# Patient Record
Sex: MALE
[De-identification: names, ages, dates, MRNs, and addresses within clinical notes are randomized per-mention and may not be internally consistent; named-entity substitution may affect disease eponyms.]

---

## 2021-08-04 PROBLEM — Z00.00 ENCOUNTER FOR PREVENTIVE HEALTH EXAMINATION: Status: ACTIVE | Noted: 2021-08-04

## 2021-09-03 ENCOUNTER — APPOINTMENT (OUTPATIENT)
Dept: NEUROLOGY | Facility: CLINIC | Age: 26
End: 2021-09-03
Payer: COMMERCIAL

## 2021-09-03 VITALS
BODY MASS INDEX: 26.67 KG/M2 | OXYGEN SATURATION: 95 % | HEIGHT: 68 IN | DIASTOLIC BLOOD PRESSURE: 74 MMHG | SYSTOLIC BLOOD PRESSURE: 107 MMHG | WEIGHT: 176 LBS | HEART RATE: 82 BPM | TEMPERATURE: 98.2 F

## 2021-09-03 DIAGNOSIS — G37.9 DEMYELINATING DISEASE OF CENTRAL NERVOUS SYSTEM, UNSPECIFIED: ICD-10-CM

## 2021-09-03 PROCEDURE — 99205 OFFICE O/P NEW HI 60 MIN: CPT

## 2021-09-03 NOTE — CONSULT LETTER
[Dear  ___] : Dear  [unfilled], [Consult Letter:] : I had the pleasure of evaluating your patient, [unfilled]. [Please see my note below.] : Please see my note below. [Consult Closing:] : Thank you very much for allowing me to participate in the care of this patient.  If you have any questions, please do not hesitate to contact me. [Sincerely,] : Sincerely, [FreeTextEntry3] : Ramiro Rivas MD MSc\par Neuroimmunology and Multiple Sclerosis\par [facility]\par [facility]

## 2021-09-03 NOTE — HISTORY OF PRESENT ILLNESS
[FreeTextEntry1] : Reason for consult: abnormal brain mri\par \par HPI: EMILY ULLOA is a 25 year old man \par \par Late 2020 - horizontal diplopia, intermittent, unclear if monocular of binocular, lasted 2 months and then resolved. \par Chronic HAs x3y, daily, sometimes better at night.\par Sleeps 10-12h.\par Some memory dysfunction as well.\par Some dizziness after work. 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  Normally conversant.  Follows commands, names, and repeats.  Good attention.\par \par PERRL, VFF, EOMI, no nystagmus, face symmetric, TUP at midline.\par \par Motor: \par                                                 R:                               L:\par Del                                           5                                5\par Bi                                              5                               5\par Tri                                            5                               5\par Wrist Extensors                      5                               5\par Finger abductors                    5                               5\par                                         5                               5 \par \par HF                                           5                               5\par KE                                           5                               5\par KF                                           5                               5\par DF                                           5                               5\par PF                                           5                               5\par \par Tone                                       R                               L\par UE                                          0                                0 \par LE                                          0                                0\par \par Sensory                                RUE                      LUE                 RLE                LLE     \par LT                                           +                            +                      +                   +\par Vib                                          +                            +                      +                   +\par JPS                                         +                            +                      +                   +\par PP                                         +                            +                      +                   +\par Temp                                     +                            +                      +                   +\par \par Reflexes:\par                                              R                             L                            \par Biceps                                  2                             2\par BR                                        2                             2\par Triceps                                2                              2\par Pat                                        2                            2 \par AJ                                        2                             2\par \par TOES                                    F                            F\par \par \par Coordination:\par                                              R                             L                       \par FTN                                       0                             0 \par KYE                                      0                            0\par HTS                                      0                             0 \par \par Other                                                                          \par  \par Gait: normal, can heel, toe, tandem\par \par                     Assistance: none\par \par MRI brain 7/2021 - reviewed, notable for several PV t2h with a typical appearance, zheng's fingers on sag flair.\par \par AP: 26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 He does not meet criteria for MS. The diplopia is concerning for an exacerbation, although there are no signs on exam to definitively support this. The cognitive symptoms may be related, but nonspecific and could be related to daily headaches. At this time, my recommendation is to defer DMT use and to repeat MRIs in the short term. \par \par all questions answered, education provided, management discussed at length. counseled on lifestyle, vitD.\par \par - labs\par - MRI brain, C, and T in late 10/2021 (3m interval, w and wo)\par - f/u with Grisel Rosado for [facility] management\par - will consider neuropsych testing referral at next visit\par - RTC after new MRIs\par \par \par

## 2021-09-14 ENCOUNTER — TRANSCRIPTION ENCOUNTER (OUTPATIENT)
Age: 26
End: 2021-09-14

## 2021-09-23 ENCOUNTER — NON-APPOINTMENT (OUTPATIENT)
Age: 26
End: 2021-09-23

## 2021-10-16 ENCOUNTER — RESULT REVIEW (OUTPATIENT)
Age: 26
End: 2021-10-16

## 2021-10-16 ENCOUNTER — OUTPATIENT (OUTPATIENT)
Dept: OUTPATIENT SERVICES | Facility: HOSPITAL | Age: 26
LOS: 1 days | End: 2021-10-16

## 2021-10-16 ENCOUNTER — APPOINTMENT (OUTPATIENT)
Dept: MRI IMAGING | Facility: CLINIC | Age: 26
End: 2021-10-16
Payer: COMMERCIAL

## 2021-10-16 PROCEDURE — 72156 MRI NECK SPINE W/O & W/DYE: CPT | Mod: 26

## 2021-10-16 PROCEDURE — 72157 MRI CHEST SPINE W/O & W/DYE: CPT | Mod: 26

## 2021-10-16 PROCEDURE — 70553 MRI BRAIN STEM W/O & W/DYE: CPT | Mod: 26

## 2021-10-25 ENCOUNTER — LABORATORY RESULT (OUTPATIENT)
Age: 26
End: 2021-10-25

## 2021-10-26 ENCOUNTER — NON-APPOINTMENT (OUTPATIENT)
Age: 26
End: 2021-10-26

## 2021-10-26 LAB
25(OH)D3 SERPL-MCNC: 24.1 NG/ML
ALBUMIN SERPL ELPH-MCNC: 4.7 G/DL
ALP BLD-CCNC: 55 U/L
ALT SERPL-CCNC: 15 U/L
ANION GAP SERPL CALC-SCNC: 13 MMOL/L
AST SERPL-CCNC: 14 U/L
B BURGDOR IGG+IGM SER QL IB: NORMAL
BASOPHILS # BLD AUTO: 0.03 K/UL
BASOPHILS NFR BLD AUTO: 0.4 %
BILIRUB SERPL-MCNC: 0.3 MG/DL
BUN SERPL-MCNC: 18 MG/DL
CALCIUM SERPL-MCNC: 9.5 MG/DL
CHLORIDE SERPL-SCNC: 103 MMOL/L
CO2 SERPL-SCNC: 24 MMOL/L
CREAT SERPL-MCNC: 1.2 MG/DL
EOSINOPHIL # BLD AUTO: 0.03 K/UL
EOSINOPHIL NFR BLD AUTO: 0.4 %
FOLATE SERPL-MCNC: 13.1 NG/ML
GLUCOSE SERPL-MCNC: 87 MG/DL
HCT VFR BLD CALC: 49 %
HGB BLD-MCNC: 16.6 G/DL
IMM GRANULOCYTES NFR BLD AUTO: 0.6 %
LYMPHOCYTES # BLD AUTO: 1.9 K/UL
LYMPHOCYTES NFR BLD AUTO: 22.2 %
MAN DIFF?: NORMAL
MCHC RBC-ENTMCNC: 30.9 PG
MCHC RBC-ENTMCNC: 33.9 GM/DL
MCV RBC AUTO: 91.2 FL
MONOCYTES # BLD AUTO: 0.54 K/UL
MONOCYTES NFR BLD AUTO: 6.3 %
NEUTROPHILS # BLD AUTO: 6.02 K/UL
NEUTROPHILS NFR BLD AUTO: 70.1 %
PLATELET # BLD AUTO: 219 K/UL
POTASSIUM SERPL-SCNC: 4.2 MMOL/L
PROT SERPL-MCNC: 7.2 G/DL
RBC # BLD: 5.37 M/UL
RBC # FLD: 11.8 %
SODIUM SERPL-SCNC: 141 MMOL/L
TSH SERPL-ACNC: 1.88 UIU/ML
VIT B12 SERPL-MCNC: 820 PG/ML
WBC # FLD AUTO: 8.57 K/UL

## 2021-10-27 ENCOUNTER — APPOINTMENT (OUTPATIENT)
Dept: NEUROLOGY | Facility: CLINIC | Age: 26
End: 2021-10-27
Payer: COMMERCIAL

## 2021-10-27 LAB — DSDNA AB SER-ACNC: <12 IU/ML

## 2021-10-27 PROCEDURE — 99214 OFFICE O/P EST MOD 30 MIN: CPT | Mod: 95

## 2021-10-28 DIAGNOSIS — Z79.899 OTHER LONG TERM (CURRENT) DRUG THERAPY: ICD-10-CM

## 2021-10-28 LAB — ANA SER IF-ACNC: NEGATIVE

## 2021-10-29 ENCOUNTER — TRANSCRIPTION ENCOUNTER (OUTPATIENT)
Age: 26
End: 2021-10-29

## 2021-10-29 LAB
ENA SS-A AB SER IA-ACNC: <0.2 AL
ENA SS-B AB SER IA-ACNC: <0.2 AL

## 2021-10-29 NOTE — HISTORY OF PRESENT ILLNESS
[FreeTextEntry1] : Inital hx 9/2021\par Late 2020 - horizontal diplopia, intermittent, unclear if monocular of binocular, lasted 2 months and then resolved. \par Chronic HAs x3y, daily, sometimes better at night.\par Sleeps 10-12h.\par Some memory dysfunction as well.\par Some dizziness after work. \par Sees Grisel Rosado at [facility].\par Got brain MRI which showed lesions.\par \par Subj interval:\par \par Today, has noted that his R hand is harder to move, less noticeable over past few days. No trouble with buttoning buttons. slight decreased sensation. \par \par PMHX:\par chronic headaches\par testosterone issues leading to depression, now resolved\par \par MEDS:\par clomid \par finasteride\par nurtec\par \par O:\par \par AO3. Normally conversant. Follows commands, names, and repeats. 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 He does not meet criteria for MS. The diplopia is concerning for an exacerbation, although there are no signs on exam to definitively support this. The cognitive symptoms may be related, but nonspecific and could be related to daily headaches. At this time, my recommendation is to defer DMT use and to repeat MRIs in the short term. \par \par all questions answered, education provided, management discussed at length. counseled on lifestyle, vitD.\par \par - pre-DMT labs\par - start vitamin D3 2ku\par - will consider neuropsych referral at next visit\par - RTC 11/10.\par

## 2021-11-04 LAB
25(OH)D3 SERPL-MCNC: 24.3 NG/ML
ALBUMIN SERPL ELPH-MCNC: 4.8 G/DL
ALP BLD-CCNC: 62 U/L
ALT SERPL-CCNC: 23 U/L
AST SERPL-CCNC: 19 U/L
BASOPHILS # BLD AUTO: 0.04 K/UL
BASOPHILS NFR BLD AUTO: 0.5 %
BILIRUB DIRECT SERPL-MCNC: 0.1 MG/DL
BILIRUB INDIRECT SERPL-MCNC: 0.3 MG/DL
BILIRUB SERPL-MCNC: 0.4 MG/DL
EOSINOPHIL # BLD AUTO: 0.04 K/UL
EOSINOPHIL NFR BLD AUTO: 0.5 %
HCT VFR BLD CALC: 51.5 %
HGB BLD-MCNC: 17.2 G/DL
IMM GRANULOCYTES NFR BLD AUTO: 0.6 %
LYMPHOCYTES # BLD AUTO: 2.04 K/UL
LYMPHOCYTES NFR BLD AUTO: 25.5 %
MAN DIFF?: NORMAL
MCHC RBC-ENTMCNC: 30.4 PG
MCHC RBC-ENTMCNC: 33.4 GM/DL
MCV RBC AUTO: 91 FL
MONOCYTES # BLD AUTO: 0.49 K/UL
MONOCYTES NFR BLD AUTO: 6.1 %
NEUTROPHILS # BLD AUTO: 5.34 K/UL
NEUTROPHILS NFR BLD AUTO: 66.8 %
PLATELET # BLD AUTO: 261 K/UL
PROT SERPL-MCNC: 7.5 G/DL
RBC # BLD: 5.66 M/UL
RBC # FLD: 11.6 %
VZV AB TITR SER: POSITIVE
VZV IGG SER IF-ACNC: 3374 INDEX
WBC # FLD AUTO: 8 K/UL

## 2021-11-07 LAB
M TB IFN-G BLD-IMP: NEGATIVE
QUANTIFERON TB PLUS MITOGEN MINUS NIL: 7.68 IU/ML
QUANTIFERON TB PLUS NIL: 0.04 IU/ML
QUANTIFERON TB PLUS TB1 MINUS NIL: 0 IU/ML
QUANTIFERON TB PLUS TB2 MINUS NIL: -0.01 IU/ML

## 2021-11-10 ENCOUNTER — APPOINTMENT (OUTPATIENT)
Dept: NEUROLOGY | Facility: CLINIC | Age: 26
End: 2021-11-10
Payer: COMMERCIAL

## 2021-11-10 LAB
JCV INDEX: 1.67
STRATIFY JCV ANTIBODY: POSITIVE

## 2021-11-10 PROCEDURE — 99215 OFFICE O/P EST HI 40 MIN: CPT | Mod: 95

## 2021-11-11 ENCOUNTER — TRANSCRIPTION ENCOUNTER (OUTPATIENT)
Age: 26
End: 2021-11-11

## 2021-11-11 NOTE — HISTORY OF PRESENT ILLNESS
[Home] : at home, [unfilled] , at the time of the visit. [Medical Office: ([facility])___] : at the medical office located in  [Verbal consent obtained from patient] : the patient, [unfilled] [FreeTextEntry1] : Inital hx 9/2021\par Late 2020 - horizontal diplopia, intermittent, unclear if monocular of binocular, lasted 2 months and then resolved. Associated with urinary frequency.\par Chronic HAs x3y, daily, sometimes better at night.\par Sleeps 10-12h.\par Some memory dysfunction as well.\par Some dizziness after work. \par Sees Grisel Rosado at [facility].\par Got brain MRI which showed lesions.\par 10/2021 - Today, has noted that his R hand is harder to move, less noticeable over past few days. No trouble with buttoning buttons. slight decreased sensation. \par \par \par Subj interval:\par \par R hand back to normal. No new symptoms.\par \par PMHX:\par chronic headaches\par testosterone issues leading to depression, now resolved\par \par MEDS:\par clomid \par finasteride\par nurtec and ubrelvy. tried triptans and didn't help. \par \par \par O:\par \par AO3. Normally conversant. Follows commands, names, and repeats. 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 He does not meet criteria for MS. The diplopia is concerning for an exacerbation, although there are no signs on exam to definitively support this. The cognitive symptoms may be related, but nonspecific and could be related to daily headaches. At this time, my recommendation is to proceed with DMT given dynamic RIS. \par \par all questions answered, education provided, management discussed at length. counseled on lifestyle, vitD.\par \par - discussed vumerity vs copaxone, full risk/benefit of each, including infectious, rare brain infection as it applies to each. he is leaning towards vumerity but wants to discuss with his sister - he will let me know.\par - cont vitamin D3 2ku\par - will consider neuropsych referral at next visit\par - RTC 11/10.\par

## 2021-11-12 ENCOUNTER — TRANSCRIPTION ENCOUNTER (OUTPATIENT)
Age: 26
End: 2021-11-12

## 2021-11-17 ENCOUNTER — TRANSCRIPTION ENCOUNTER (OUTPATIENT)
Age: 26
End: 2021-11-17

## 2021-11-18 ENCOUNTER — NON-APPOINTMENT (OUTPATIENT)
Age: 26
End: 2021-11-18

## 2021-12-09 ENCOUNTER — TRANSCRIPTION ENCOUNTER (OUTPATIENT)
Age: 26
End: 2021-12-09

## 2021-12-13 ENCOUNTER — TRANSCRIPTION ENCOUNTER (OUTPATIENT)
Age: 26
End: 2021-12-13

## 2022-01-03 ENCOUNTER — TRANSCRIPTION ENCOUNTER (OUTPATIENT)
Age: 27
End: 2022-01-03

## 2022-01-14 ENCOUNTER — APPOINTMENT (OUTPATIENT)
Dept: NEUROLOGY | Facility: CLINIC | Age: 27
End: 2022-01-14

## 2022-01-28 ENCOUNTER — APPOINTMENT (OUTPATIENT)
Dept: NEUROLOGY | Facility: CLINIC | Age: 27
End: 2022-01-28

## 2022-02-25 ENCOUNTER — APPOINTMENT (OUTPATIENT)
Dept: NEUROLOGY | Facility: CLINIC | Age: 27
End: 2022-02-25
Payer: COMMERCIAL

## 2022-02-25 VITALS
OXYGEN SATURATION: 98 % | SYSTOLIC BLOOD PRESSURE: 124 MMHG | HEIGHT: 69 IN | DIASTOLIC BLOOD PRESSURE: 76 MMHG | HEART RATE: 100 BPM | BODY MASS INDEX: 24.29 KG/M2 | WEIGHT: 164 LBS | TEMPERATURE: 97.4 F

## 2022-02-25 DIAGNOSIS — G35 MULTIPLE SCLEROSIS: ICD-10-CM

## 2022-02-25 PROCEDURE — 99214 OFFICE O/P EST MOD 30 MIN: CPT

## 2022-02-25 NOTE — HISTORY OF PRESENT ILLNESS
[FreeTextEntry1] : Inital hx 9/2021\par Difficulties with bladder dysfunction in the past several years - frequency, some mild urgency.\par Late 2020 - horizontal diplopia, intermittent, unclear if monocular of binocular, lasted 2 months and then resolved. Associated with urinary frequency.\par Chronic HAs x3y, daily, sometimes better at night.\par Sleeps 10-12h.\par Some memory dysfunction as well.\par Some dizziness after work. \par Sees Grisel Rosado at [facility].\par Got brain MRI which showed lesions.\par 10/2021 - Today, has noted that his R hand is harder to move, less noticeable over past few days. No trouble with buttoning buttons. slight decreased sensation. by 11/2021, resolved. \par 11/2021 - recurrence of diplopia but seemed monocular.\par 11/2021 - Numbness/weakness in RLE that persisted\par 12/2021 - developed electric shock sensations in both legs, lasted several weeks. \par \par \par Subj interval:\par \par Started vumerity in 12/2021. No s/e. \par \par Headaches have subsided a lot. Occurs 2x/wk, milder, but lasts several hours. Did not get any botox. Nurtec had worked very well in the past. Ubrelvy also worked somewhat. Sumatriptan, rizatriptan, and amitryptiline didn't help. \par \par Diplopia still persists, monocular.\par \par Since 11/2021, RLE feels different than the LLE, temperature feels strange. \par \par No current BB dysfunction. Difficulties with bladder dysfunction in the past, also with ED/sex drive. Went to endocrinologist who gave him testosterone supplements. \par \par PMHX:\par chronic headaches\par testosterone issues leading to depression, now resolved\par \par MEDS:\par finasteride\par vumerity\par vitD3 2ku \par \par O:\par \par AO3. Normally conversant. Follows commands, names, and repeats. Good attention.\par \par PERRL, VFF, EOMI, no nystagmus, face symmetric, TUP at midline.\par \par Motor: \par    R:  L:\par Del   5  5\par Bi   5  5\par Tri   5  5\par Wrist Extensors  5  5\par Finger abductors  5  5\par    5  5 \par \par HF   5  5\par KE   5  5\par KF   5  5\par DF   5  5\par PF   5  5\par \par Tone   R  L\par UE   0  0 \par LE   0  0\par \par Sensory  RUE  LUE  RLE LLE \par LT   +  +  +  +\par Vib   +  +  +  +\par JPS   +  +  +  +\par PP   +  +  +  +\par Temp   +  +  +  +\par \par Reflexes:\par    R  L  \par Biceps   2  2\par BR   2  2\par Triceps  2  2\par Pat   2  2 \par AJ   2  2\par \par TOES   F  F\par \par \par Coordination:\par    R  L  \par FTN   0  0 \par KYE   0  0\par HTS   0  0 \par \par Other     \par  \par Gait: normal, can heel, toe, tandem\par \par   Assistance: none\par \par \par \par MRI brain 7/2021 - reviewed, notable for several PV t2h with a typical appearance, zheng's fingers on sag flair.\par \par MRI brain 10/2021 - new enhancing left temporal PV lesion.\par \par MRI C+T 10/2021 - no definite cord lesions. 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 He does not meet criteria for MS. The diplopia is concerning for an exacerbation, although there are no signs on exam to definitively support this. The cognitive symptoms may be related, but nonspecific and could be related to daily headaches. At this time, my recommendation is to proceed with DMT given dynamic RIS. \par \par all questions answered, education provided, management discussed at length. counseled on lifestyle, vitD.\par \par - cont vumerity\par - cont vitamin D3 2ku\par - new brain, C, and T spine MRIs (new baseline on new med, also with recent new sx)\par - lab work\par - restart nurtec for migraines (for auth: failed sumatriptan and rizatriptan, failed amitryptline, nurtec has worked well in the past).\par - RTC 3m

## 2022-03-01 ENCOUNTER — TRANSCRIPTION ENCOUNTER (OUTPATIENT)
Age: 27
End: 2022-03-01

## 2022-03-02 ENCOUNTER — TRANSCRIPTION ENCOUNTER (OUTPATIENT)
Age: 27
End: 2022-03-02

## 2022-03-08 ENCOUNTER — APPOINTMENT (OUTPATIENT)
Dept: MRI IMAGING | Facility: CLINIC | Age: 27
End: 2022-03-08
Payer: COMMERCIAL

## 2022-03-08 ENCOUNTER — RESULT REVIEW (OUTPATIENT)
Age: 27
End: 2022-03-08

## 2022-03-08 ENCOUNTER — OUTPATIENT (OUTPATIENT)
Dept: OUTPATIENT SERVICES | Facility: HOSPITAL | Age: 27
LOS: 1 days | End: 2022-03-08

## 2022-03-08 PROCEDURE — 72156 MRI NECK SPINE W/O & W/DYE: CPT | Mod: 26

## 2022-03-08 PROCEDURE — 72157 MRI CHEST SPINE W/O & W/DYE: CPT | Mod: 26

## 2022-03-08 PROCEDURE — 70553 MRI BRAIN STEM W/O & W/DYE: CPT | Mod: 26

## 2022-03-12 ENCOUNTER — TRANSCRIPTION ENCOUNTER (OUTPATIENT)
Age: 27
End: 2022-03-12

## 2022-03-15 ENCOUNTER — TRANSCRIPTION ENCOUNTER (OUTPATIENT)
Age: 27
End: 2022-03-15

## 2022-03-25 ENCOUNTER — APPOINTMENT (OUTPATIENT)
Dept: NEUROLOGY | Facility: CLINIC | Age: 27
End: 2022-03-25
Payer: COMMERCIAL

## 2022-03-25 PROCEDURE — 99214 OFFICE O/P EST MOD 30 MIN: CPT | Mod: 95

## 2022-03-25 NOTE — HISTORY OF PRESENT ILLNESS
[Home] : at home, [unfilled] , at the time of the visit. [Medical Office: ([facility])___] : at the medical office located in  [Verbal consent obtained from patient] : the patient, [unfilled] [FreeTextEntry1] : Inital hx 9/2021\par Difficulties with bladder dysfunction in the past several years - frequency, some mild urgency.\par Late 2020 - horizontal diplopia, intermittent, unclear if monocular of binocular, lasted 2 months and then resolved. Associated with urinary frequency.\par Chronic HAs x3y, daily, sometimes better at night.\par Sleeps 10-12h.\par Some memory dysfunction as well.\par Some dizziness after work. \par Sees Grisel Rosado at [facility].\par Got brain MRI which showed lesions.\par 10/2021 - Today, has noted that his R hand is harder to move, less noticeable over past few days. No trouble with buttoning buttons. slight decreased sensation. by 11/2021, resolved. \par 11/2021 - recurrence of diplopia but seemed monocular.\par 11/2021 - Numbness/weakness in RLE that persisted\par 12/2021 - developed electric shock sensations in both legs, lasted several weeks. \par HAs improved by 2/2022. \par \par \par Subj interval:\par \par Started vumerity in 12/2021. No s/e. \par \par Started aimovig 1wk ago. Over past 2wks, having daily headaches. Uses nurtec QOD on prn basis. Ubrelvy also worked somewhat. Sumatriptan, rizatriptan, and amitryptiline didn't help. \par \par Diplopia still persists, monocular. Has seen ophtho in the past and unremarkable evaluation.\par \par \par PMHX:\par chronic headaches\par testosterone issues leading to depression, now resolved\par RIS/MS\par \par \par MEDS:\par finasteride\par vumerity\par vitD3 2ku \par \par O:\par \par AO3\par face symm\par moves both arms well\par \par \par MRI brain 7/2021 - reviewed, notable for several PV t2h with a typical appearance, zheng's fingers on sag flair.\par \par MRI brain 10/2021 - new enhancing right temporal PV lesion.\par \par MRI C+T 10/2021 - no definite cord lesions. 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 He does not meet criteria for MS. The diplopia is concerning for an exacerbation, although there are no signs on exam to definitively support this, and it appears to be monocular. The cognitive symptoms may be related, but nonspecific and could be related to daily headaches. At this time, my recommendation is to proceed with DMT given dynamic RIS. \par \par all questions answered, education provided, management discussed at length. counseled on lifestyle, vitD.\par \par - cont vumerity\par - cont vitamin D3 2ku\par - new brain, C, and T spine MRIs in 2/2023\par - lab work\par - cont aimovig, cont nurtec prn. consider botox in the future.\par - RTC 3m

## 2022-06-08 ENCOUNTER — TRANSCRIPTION ENCOUNTER (OUTPATIENT)
Age: 27
End: 2022-06-08

## 2022-09-28 ENCOUNTER — RX RENEWAL (OUTPATIENT)
Age: 27
End: 2022-09-28

## 2022-11-22 ENCOUNTER — FORM ENCOUNTER (OUTPATIENT)
Age: 27
End: 2022-11-22

## 2023-02-10 ENCOUNTER — APPOINTMENT (OUTPATIENT)
Dept: NEUROLOGY | Facility: CLINIC | Age: 28
End: 2023-02-10
Payer: COMMERCIAL

## 2023-02-10 VITALS
TEMPERATURE: 98.5 F | DIASTOLIC BLOOD PRESSURE: 83 MMHG | HEIGHT: 69 IN | OXYGEN SATURATION: 98 % | BODY MASS INDEX: 24.29 KG/M2 | WEIGHT: 164 LBS | HEART RATE: 111 BPM | SYSTOLIC BLOOD PRESSURE: 148 MMHG

## 2023-02-10 DIAGNOSIS — M54.2 CERVICALGIA: ICD-10-CM

## 2023-02-10 PROCEDURE — 99214 OFFICE O/P EST MOD 30 MIN: CPT

## 2023-02-10 RX ORDER — RIMEGEPANT SULFATE 75 MG/75MG
75 TABLET, ORALLY DISINTEGRATING ORAL EVERY OTHER DAY
Qty: 15 | Refills: 5 | Status: DISCONTINUED | COMMUNITY
Start: 2022-02-25 | End: 2023-02-10

## 2023-02-17 NOTE — HISTORY OF PRESENT ILLNESS
[FreeTextEntry1] : Inital hx 9/2021\par Difficulties with bladder dysfunction in the past several years - frequency, some mild urgency.\par Late 2020 - horizontal diplopia, intermittent, unclear if monocular of binocular, lasted 2 months and then resolved. Associated with urinary frequency.\par Chronic HAs x3y, daily, sometimes better at night.\par Sleeps 10-12h.\par Some memory dysfunction as well.\par Some dizziness after work. \par Sees Grisel Rosado at [facility].\par Got brain MRI which showed lesions.\par 10/2021 - Today, has noted that his R hand is harder to move, less noticeable over past few days. No trouble with buttoning buttons. slight decreased sensation. by 11/2021, resolved. \par 11/2021 - recurrence of diplopia but seemed monocular.\par 11/2021 - Numbness/weakness in RLE that persisted\par 12/2021 - developed electric shock sensations in both legs, lasted several weeks. \par Started vumerity in 12/2021. No s/e. \par HAs improved by 2/2022 but recurred in 3/2022, then started aimovig and it helped substantially, then he stopped by mistake.\par \par \par \par Subj interval:\par \par No new MS symptoms.\par \par Daily headaches. Was substantially better while on aimovig but he misunderstood and thought he should stop it.  Uses nurtec QOD on prn basis. Ubrelvy also worked somewhat. Sumatriptan, rizatriptan, and amitryptiline didn't help. Some diplopia associated with the HAs.\par \par Mood is improved. \par \par Cognitively feels stable but has difficulties with regards to focus, attention. Adderral helps as well. \par \par Fatigue improved since starting vumerity.\par \par \par PMHX:\par chronic headaches\par testosterone issues leading to depression, now resolved\par RIS/MS\par \par \par MEDS:\par finasteride\par vumerity\par vitD3 2ku \par adderral 10-25mg/day since 2021 - prescribed by psychiatrist\par \par \par O:\par \par AO3. Normally conversant. Follows commands, names, and repeats. Good attention.\par \par PERRL, VFF, EOMI, no nystagmus, face symmetric, TUP at midline.\par \par Motor: \par  R: L:\par Del 5 5\par Bi 5 5\par Tri 5 5\par Wrist Extensors 5 5\par Finger abductors 5 5\par  5 5 \par \par HF 5 5\par KE 5 5\par KF 5 5\par DF 5 5\par PF 5 5\par \par Tone R L\par UE 0 0 \par LE 0 0\par \par Sensory RUE LUE RLE LLE \par LT + + + +\par Vib + + + +\par JPS + + + +\par PP + + + +\par Temp + + + +\par \par Reflexes:\par  R L \par Biceps 2 2\par BR 2 2\par Triceps 2 2\par Pat 2 2 \par AJ 2 2\par \par TOES F F\par \par Coordination:\par  R L \par FTN 0 0 \par KYE 0 0\par HTS 0 0 \par \par Other \par  \par Gait: normal, can heel, toe, tandem\par \par  Assistance: none\par \par \par MRI brain 7/2021 - reviewed, notable for several PV t2h with a typical appearance, zheng's fingers on sag flair.\par \par MRI brain 10/2021 - new enhancing right temporal PV lesion.\par \par MRI C+T 10/2021 - no definite cord lesions. 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 He does not meet criteria for MS. The diplopia is concerning for an exacerbation, although there are no signs on exam to definitively support this, and it appears to be monocular. The cognitive symptoms may be related, but nonspecific and could be related to daily headaches. 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 Started vumerity in 12/2021\par \par MS likely stable. Some fatigue, cognitive dysfunction. \par \par all questions answered, education provided, management discussed at length. counseled on lifestyle, vitD.\par \par - cont vumerity pending blood work results\par - cont vitamin D3 2ku\par - new brain, C, and T spine MRIs\par - lab work q6m\par - restart aimovig, restart nurtec prn. (for auth: previously very helpful. Sumatriptan, rizatriptan, and amitryptiline didn't help in the past). consider botox in the future.\par - pt not interested in neuropsych testing at this time.\par - PT for neck pain.\par - RTC 6m

## 2023-02-20 ENCOUNTER — RX RENEWAL (OUTPATIENT)
Age: 28
End: 2023-02-20

## 2023-06-01 ENCOUNTER — TRANSCRIPTION ENCOUNTER (OUTPATIENT)
Age: 28
End: 2023-06-01

## 2024-01-22 ENCOUNTER — RX RENEWAL (OUTPATIENT)
Age: 29
End: 2024-01-22

## 2024-01-22 DIAGNOSIS — G43.709 CHRONIC MIGRAINE W/OUT AURA, NOT INTRACTABLE, W/OUT STATUS MIGRAINOSUS: ICD-10-CM

## 2024-01-22 RX ORDER — ERENUMAB-AOOE 140 MG/ML
140 INJECTION, SOLUTION SUBCUTANEOUS
Qty: 1 | Refills: 5 | Status: ACTIVE | COMMUNITY
Start: 2022-03-15 | End: 1900-01-01

## 2024-01-26 ENCOUNTER — NON-APPOINTMENT (OUTPATIENT)
Age: 29
End: 2024-01-26

## 2024-02-02 ENCOUNTER — NON-APPOINTMENT (OUTPATIENT)
Age: 29
End: 2024-02-02

## 2024-02-07 ENCOUNTER — APPOINTMENT (OUTPATIENT)
Dept: NEUROLOGY | Facility: CLINIC | Age: 29
End: 2024-02-07
Payer: COMMERCIAL

## 2024-02-07 VITALS
WEIGHT: 153 LBS | SYSTOLIC BLOOD PRESSURE: 107 MMHG | HEART RATE: 110 BPM | DIASTOLIC BLOOD PRESSURE: 68 MMHG | OXYGEN SATURATION: 97 % | HEIGHT: 69 IN | BODY MASS INDEX: 22.66 KG/M2 | TEMPERATURE: 97.5 F

## 2024-02-07 PROCEDURE — G2211 COMPLEX E/M VISIT ADD ON: CPT

## 2024-02-07 PROCEDURE — 99215 OFFICE O/P EST HI 40 MIN: CPT

## 2024-02-07 RX ORDER — DIROXIMEL FUMARATE 231 MG/1
231 CAPSULE ORAL
Qty: 120 | Refills: 0 | Status: DISCONTINUED | COMMUNITY
Start: 2021-11-16 | End: 2024-02-07

## 2024-02-09 ENCOUNTER — APPOINTMENT (OUTPATIENT)
Dept: MRI IMAGING | Facility: CLINIC | Age: 29
End: 2024-02-09
Payer: COMMERCIAL

## 2024-02-09 ENCOUNTER — OUTPATIENT (OUTPATIENT)
Dept: OUTPATIENT SERVICES | Facility: HOSPITAL | Age: 29
LOS: 1 days | End: 2024-02-09

## 2024-02-09 PROCEDURE — 70553 MRI BRAIN STEM W/O & W/DYE: CPT | Mod: 26

## 2024-02-09 PROCEDURE — 76377 3D RENDER W/INTRP POSTPROCES: CPT | Mod: 26

## 2024-02-09 PROCEDURE — 72156 MRI NECK SPINE W/O & W/DYE: CPT | Mod: 26

## 2024-02-09 RX ORDER — OZANIMOD HYDROCHLORIDE 0.92 MG/1
0.92 CAPSULE ORAL
Qty: 30 | Refills: 5 | Status: ACTIVE | COMMUNITY
Start: 2024-02-07 | End: 1900-01-01

## 2024-02-09 RX ORDER — OZANIMOD HYDROCHLORIDE 0.23-0.46
4 X 0.23MG & KIT ORAL
Qty: 1 | Refills: 0 | Status: ACTIVE | COMMUNITY
Start: 2024-02-07 | End: 1900-01-01

## 2024-02-09 NOTE — HISTORY OF PRESENT ILLNESS
[FreeTextEntry1] : Inital hx 9/2021 Difficulties with bladder dysfunction in the past several years - frequency, some mild urgency. Late 2020 - horizontal diplopia, intermittent, unclear if monocular of binocular, lasted 2 months and then resolved. Associated with urinary frequency. Chronic HAs x3y, daily, sometimes better at night. Sleeps 10-12h. Some memory dysfunction as well. Some dizziness after work.  Sees Grisel Rosado at [facility]. Got brain MRI which showed lesions. 10/2021 - Today, has noted that his R hand is harder to move, less noticeable over past few days. No trouble with buttoning buttons. slight decreased sensation. by 11/2021, resolved.  11/2021 - recurrence of diplopia but seemed monocular. 11/2021 - Numbness/weakness in RLE that persisted 12/2021 - developed electric shock sensations in both legs, lasted several weeks.  Started vumerity in 12/2021. No s/e.  HAs improved by 2/2022 but recurred in 3/2022, then started aimovig and it helped substantially, then he stopped by mistake. Stopped vumerity in late 2023 unilaterally due to issues getting it from [employer]. 2/2024 - had RLE>LLE weakness associated with sensory change. I had advised him to go to ED at [facility] but he began to improve. Improved 100% on the LLE, 92% on the RLE.   Subj interval:  2/2024 - had RLE>LLE weakness associated with sensory change. I had advised him to go to ED at [facility] but he began to improve. Improved 100% on the LLE, 92% on the RLE.  HAs - substantially better while on aimovig - 2x/wk. Uses nurtec on prn basis. Ubrelvy also worked somewhat. Sumatriptan, rizatriptan, and amitryptiline didn't help. Some diplopia associated with the HAs.  Mood is improved.   Cognitively feels stable but has difficulties with regards to focus, attention. Adderral helps as well.    PMHX: chronic headaches testosterone issues leading to depression, now resolved RIS/MS   MEDS: finasteride vitD3 2ku  adderral 10-25mg/day since 2021 - prescribed by psychiatrist   O:  AO3. Normally conversant. Follows commands, names, and repeats. Good attention.  PERRL, VFF, EOMI, no nystagmus, face symmetric, TUP at midline.  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  MRI C+T 10/2021 - no definite cord lesions.   MRI brain, C, and T 3/2022 - no new lesions, improvement of right temporal PV lesion.   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 He does not meet criteria for MS. The diplopia is concerning for an exacerbation, although there are no signs on exam to definitively support this, and it appears to be monocular. The cognitive symptoms may be related, but nonspecific and could be related to daily headaches. 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 Started vumerity in 12/2021. Stopped vumerity in late 2023 due to issues getting med from [employer].  Possible MS relapse in 2/2024 while off DMT. Neuro exam is unrevealing. Agree with DMT switch.   Discussed full benefit risk profile of ocrevus, kesimpta, and zeposia, including risk of infusion/injection reactions, infectious, rare serious infections, PML, possible neoplastic risk, possible effects on vaccine response, blood effects, as each risk pertains to each medication.  all questions answered, education provided, management discussed at length. counseled on lifestyle, vitD.  - pt opts to start zeposia. - lab work now pre-zeposia, then q6m - EKG and ophtho through zeposia team - defer IVMP at this time as exam is favorable.  - cont vitamin D3 2ku - new brain, C, and T spine MRIs this week - cont aimovig, cont nurtec prn. (for auth: previously very helpful. Sumatriptan, rizatriptan, and amitryptiline didn't help in the past). consider botox in the future if needs it.  - pt not interested in neuropsych testing at this time. - RTC 2m after starting, pt may move to [place] prior to that but still wants to follow up here. I advised that he should also establish care with local neurologist.

## 2024-02-14 ENCOUNTER — RESULT REVIEW (OUTPATIENT)
Age: 29
End: 2024-02-14

## 2024-02-14 ENCOUNTER — APPOINTMENT (OUTPATIENT)
Dept: MRI IMAGING | Facility: CLINIC | Age: 29
End: 2024-02-14
Payer: COMMERCIAL

## 2024-02-14 ENCOUNTER — OUTPATIENT (OUTPATIENT)
Dept: OUTPATIENT SERVICES | Facility: HOSPITAL | Age: 29
LOS: 1 days | End: 2024-02-14

## 2024-02-14 PROCEDURE — 72157 MRI CHEST SPINE W/O & W/DYE: CPT | Mod: 26

## 2024-02-15 ENCOUNTER — NON-APPOINTMENT (OUTPATIENT)
Age: 29
End: 2024-02-15

## 2024-02-16 ENCOUNTER — APPOINTMENT (OUTPATIENT)
Dept: PRIMARY CARE | Facility: CLINIC | Age: 29
End: 2024-02-16

## 2024-02-16 ENCOUNTER — OUTPATIENT (OUTPATIENT)
Dept: OUTPATIENT SERVICES | Facility: HOSPITAL | Age: 29
LOS: 1 days | End: 2024-02-16

## 2024-02-21 RX ORDER — DIROXIMEL FUMARATE 231 MG/1
231 CAPSULE ORAL
Qty: 120 | Refills: 11 | Status: DISCONTINUED | COMMUNITY
Start: 2021-11-16 | End: 2024-02-21

## 2024-02-29 RX ORDER — RIMEGEPANT SULFATE 75 MG/75MG
75 TABLET, ORALLY DISINTEGRATING ORAL
Qty: 8 | Refills: 0 | Status: ACTIVE | COMMUNITY
Start: 2022-03-01 | End: 1900-01-01

## 2024-05-10 ENCOUNTER — APPOINTMENT (OUTPATIENT)
Dept: NEUROLOGY | Facility: CLINIC | Age: 29
End: 2024-05-10
Payer: COMMERCIAL

## 2024-05-10 PROCEDURE — G2211 COMPLEX E/M VISIT ADD ON: CPT

## 2024-05-10 PROCEDURE — 99214 OFFICE O/P EST MOD 30 MIN: CPT

## 2024-05-10 NOTE — HISTORY OF PRESENT ILLNESS
[Home] : at home, [unfilled] , at the time of the visit. [Medical Office: ([facility])___] : at the medical office located in  [Verbal consent obtained from patient] : the patient, [unfilled] [FreeTextEntry1] : Inital hx 9/2021 Difficulties with bladder dysfunction in the past several years - frequency, some mild urgency. Late 2020 - horizontal diplopia, intermittent, unclear if monocular of binocular, lasted 2 months and then resolved. Associated with urinary frequency. Chronic HAs x3y, daily, sometimes better at night. Sleeps 10-12h. Some memory dysfunction as well. Some dizziness after work.  Sees Grisel Rosado at [facility]. Got brain MRI which showed lesions. 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 10/2021 - Today, has noted that his R hand is harder to move, less noticeable over past few days. No trouble with buttoning buttons. slight decreased sensation. by 11/2021, resolved.  11/2021 - recurrence of diplopia but seemed monocular. 11/2021 - Numbness/weakness in RLE that persisted 12/2021 - developed electric shock sensations in both legs, lasted several weeks.  Started vumerity in 12/2021. No s/e.  HAs improved by 2/2022 but recurred in 3/2022, then started aimovig and it helped substantially, then he stopped by mistake. Stopped vumerity in late 2023 unilaterally due to issues getting it from [employer]. 2/2024 - had RLE>LLE weakness associated with sensory change. I had advised him to go to ED at [facility] but he began to improve. Improved 100% on the LLE, 92% on the RLE. Neuro exam was unrevealing days later, and MRIs without correlate.   Subj interval:  Feels that his symptoms of HA and cognitive change are related to location - worse in [place] and better in [place] and [facility].  HAs - resolved.  Mood is improved. Stopped adderral.   Cognitively feels better.  Did not start zeposia against recommendation.   PMHX: chronic headaches - resolved so stopped aimovig and nurtec. (for auth: previously very helpful. Sumatriptan, rizatriptan, and amitryptiline didn't help in the past). consider botox in the future if needs it.  testosterone issues leading to depression, now resolved RIS/MS   MEDS: omega3 vitD3 2ku    O:  AO3. Normally conversant. Follows commands, names, and repeats. Good attention. face symmetric moves both arms well.   MRI brain 7/2021 - reviewed, notable for several PV t2h with a typical appearance, zheng's fingers on sag flair.  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  MRI C+T 10/2021 - no definite cord lesions.   MRI brain, C, and T 3/2022 - no new lesions, improvement of right temporal PV lesion.  MRI brain, C, and T 2/2024 - stable. no definite cord lesions.   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 He does not meet criteria for MS. The diplopia is concerning for an exacerbation, although there are no signs on exam to definitively support this, and it appears to be monocular. The cognitive symptoms may be related, but nonspecific and could be related to daily headaches. 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 Started vumerity in 12/2021. Stopped vumerity in late 2023 due to issues getting med from [facility]. Possible MS relapse in 2/2024 while off DMT but neuro exam was unrevealing and MRIs unchanged. Recommended to start DMT (zeposia) in 2/2024. He opted against restarting DMT against my recommendation.  all questions answered, education provided, management discussed at length. counseled on lifestyle, vitD.  - cont vitamin D3 2ku - new brain, C, and T spine MRIs in 1y interval (2/2025) - RTC 6m, sooner prn any new symptoms.